# Patient Record
Sex: MALE | HISPANIC OR LATINO
[De-identification: names, ages, dates, MRNs, and addresses within clinical notes are randomized per-mention and may not be internally consistent; named-entity substitution may affect disease eponyms.]

---

## 2020-12-08 PROBLEM — Z00.00 ENCOUNTER FOR PREVENTIVE HEALTH EXAMINATION: Status: ACTIVE | Noted: 2020-12-08

## 2020-12-14 ENCOUNTER — APPOINTMENT (OUTPATIENT)
Dept: OTOLARYNGOLOGY | Facility: CLINIC | Age: 55
End: 2020-12-14

## 2022-05-05 ENCOUNTER — APPOINTMENT (OUTPATIENT)
Dept: OTOLARYNGOLOGY | Facility: CLINIC | Age: 57
End: 2022-05-05
Payer: COMMERCIAL

## 2022-05-05 VITALS — HEIGHT: 69 IN | TEMPERATURE: 97.7 F | BODY MASS INDEX: 28.73 KG/M2 | WEIGHT: 194 LBS

## 2022-05-05 DIAGNOSIS — Z78.9 OTHER SPECIFIED HEALTH STATUS: ICD-10-CM

## 2022-05-05 DIAGNOSIS — Z83.3 FAMILY HISTORY OF DIABETES MELLITUS: ICD-10-CM

## 2022-05-05 DIAGNOSIS — J31.0 CHRONIC RHINITIS: ICD-10-CM

## 2022-05-05 DIAGNOSIS — R06.83 SNORING: ICD-10-CM

## 2022-05-05 DIAGNOSIS — G47.8 OTHER SLEEP DISORDERS: ICD-10-CM

## 2022-05-05 DIAGNOSIS — Z77.22 CONTACT WITH AND (SUSPECTED) EXPOSURE TO ENVIRONMENTAL TOBACCO SMOKE (ACUTE) (CHRONIC): ICD-10-CM

## 2022-05-05 PROCEDURE — 31231 NASAL ENDOSCOPY DX: CPT

## 2022-05-05 PROCEDURE — 99202 OFFICE O/P NEW SF 15 MIN: CPT | Mod: 25

## 2022-05-05 RX ORDER — FLUTICASONE PROPIONATE 50 UG/1
50 SPRAY, METERED NASAL
Refills: 0 | Status: ACTIVE | COMMUNITY

## 2022-05-05 NOTE — CONSULT LETTER
[Dear  ___] : Dear  [unfilled], [Consult Letter:] : I had the pleasure of evaluating your patient, [unfilled]. [Please see my note below.] : Please see my note below. [Consult Closing:] : Thank you very much for allowing me to participate in the care of this patient.  If you have any questions, please do not hesitate to contact me. [Sincerely,] : Sincerely, [FreeTextEntry3] : Alina Funez MD\par

## 2022-05-05 NOTE — ASSESSMENT
[FreeTextEntry1] : He has a history of snoring and possible obstructive sleep apnea.  On his endoscopy, he had mild nasal mucosal edema, mildly deviated septum, and inferior turbinate hypertrophy.  Laryngeal exam was unremarkable.\par \par He also has chronic rhinitis\par \par Plan\par -Findings and management options were discussed with the patient.\par - The patient was asked to avoid sleeping on his back, eating before bed and drinking alcohol at night\par - Good sleep hygiene recommended\par - Elevation of the head of bed at night\par - Nasal steroid spray and Breathe Right strips as needed for nasal congestion\par - Sleep study to rule out obstructive sleep apnea\par - Some of the risks of untreated sleep apnea reviewed (HTN, MI, stroke and accidents)\par - Options for treatment will be discussed after the sleep study but were briefly mentioned today \par - Follow up after the sleep study\par - call and return earlier if any concerns.

## 2022-05-05 NOTE — HISTORY OF PRESENT ILLNESS
[de-identified] : LARISA HUNTER is a 57 year old patient referred by Dr. Sapp for evaluation for snoring.  He said that he has been snoring for the past 4 weeks.  His wife has told him that it is loud and heavy and he occasionally gasp for air.  He said that he did use Flonase and that helps with the snoring.  He denies restless sleep, frequent nighttime awakenings, and daytime fatigue.  He does work long hours.  He goes to bed at 10 PM and wakes up at 5:45 AM.  He did see another ENT physician about 1 year ago for another problem.  Sleep study had been recommended.  He occasionally has nasal congestion and obstruction at night.  He also tried Breathe Right strips but it is unclear if they helped much.  He denies a history of reflux.  He also denies any change in his weight.

## 2022-05-11 ENCOUNTER — TRANSCRIPTION ENCOUNTER (OUTPATIENT)
Age: 57
End: 2022-05-11

## 2025-02-10 ENCOUNTER — APPOINTMENT (OUTPATIENT)
Dept: PULMONOLOGY | Facility: CLINIC | Age: 60
End: 2025-02-10